# Patient Record
Sex: FEMALE | Race: WHITE
[De-identification: names, ages, dates, MRNs, and addresses within clinical notes are randomized per-mention and may not be internally consistent; named-entity substitution may affect disease eponyms.]

---

## 2017-12-22 ENCOUNTER — HOSPITAL ENCOUNTER (OUTPATIENT)
Dept: HOSPITAL 88 - RAD | Age: 82
End: 2017-12-22
Attending: INTERNAL MEDICINE
Payer: COMMERCIAL

## 2017-12-22 DIAGNOSIS — J20.9: Primary | ICD-10-CM

## 2017-12-22 PROCEDURE — 71020: CPT

## 2017-12-22 NOTE — DIAGNOSTIC IMAGING REPORT
PROCEDURE:

Frontal and lateral views of the chest.

 

COMPARISON: 4/11/16

 

INDICATIONS:   COUGH, FLU

     

FINDINGS:

Lines/tubes:  None.

 

Lungs:  The lungs are well inflated and clear. There is no evidence of 

pneumonia or pulmonary edema.

 

Pleura:  There is no pleural effusion or pneumothorax.

 

Heart and mediastinum:  The heart and the mediastinum are normal. Aorta 

is mildly calcified and tortuous.

 

Bones:  No acute bony abnormality.

 

IMPRESSION: 

 

No acute cardiopulmonary disease.

No change from prior exam.

 

Dictated by:  Mir Cheney M.D. on 12/22/2017 at 14:59     

Electronically approved by:  Mir Cheney M.D. on 12/22/2017 at 14:59

## 2018-05-01 ENCOUNTER — HOSPITAL ENCOUNTER (EMERGENCY)
Dept: HOSPITAL 88 - ER | Age: 83
Discharge: HOME | End: 2018-05-01
Payer: COMMERCIAL

## 2018-05-01 VITALS — WEIGHT: 166 LBS | HEIGHT: 63 IN | BODY MASS INDEX: 29.41 KG/M2

## 2018-05-01 VITALS — DIASTOLIC BLOOD PRESSURE: 95 MMHG | SYSTOLIC BLOOD PRESSURE: 139 MMHG

## 2018-05-01 DIAGNOSIS — S02.31XA: ICD-10-CM
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
## 2018-05-01 DIAGNOSIS — S51.811A: ICD-10-CM

## 2018-05-01 DIAGNOSIS — S42.411A: Primary | ICD-10-CM

## 2018-05-01 DIAGNOSIS — Z23: ICD-10-CM
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
## 2018-05-01 DIAGNOSIS — S01.111A: ICD-10-CM

## 2018-05-01 DIAGNOSIS — Z87.891: ICD-10-CM

## 2018-05-01 PROCEDURE — 90714 TD VACC NO PRESV 7 YRS+ IM: CPT

## 2018-05-01 PROCEDURE — 70486 CT MAXILLOFACIAL W/O DYE: CPT

## 2018-05-01 PROCEDURE — 99284 EMERGENCY DEPT VISIT MOD MDM: CPT

## 2018-05-01 PROCEDURE — 90471 IMMUNIZATION ADMIN: CPT

## 2018-05-01 PROCEDURE — 71045 X-RAY EXAM CHEST 1 VIEW: CPT

## 2018-05-01 PROCEDURE — 72125 CT NECK SPINE W/O DYE: CPT

## 2018-05-01 PROCEDURE — 70450 CT HEAD/BRAIN W/O DYE: CPT

## 2018-05-01 NOTE — XMS REPORT
Patient Summary Document

 Created on: 2018



LINDSAY MORIN

External Reference #: [id number]

: 1935

Sex: Female



Demographics







 Address  [address] [place] [address] [address]

[place]  [address]

 

 Home Phone  [phone]

 

 Preferred Language  Unknown

 

 Marital Status  Unknown

 

 Amish Affiliation  Unknown

 

 Race  Unknown

 

 Additional Race(s)  

 

 Ethnic Group  Unknown





Author







 Author  [facility]

 

 Address  Unknown

 

 Phone  Unavailable







Care Team Providers







 Care Team Member Name  Role  Phone

 

 JOSE MONTANO  Unavailable  Unavailable







Problems

This patient has no known problems.



Allergies, Adverse Reactions, Alerts

This patient has no known allergies or adverse reactions.



Medications

This patient has no known medications.



Results







 Test Description  Test Time  Test Comments  Text Results  Atomic Results  
Result Comments









 CHEST 2 VIEWS            [place] [address]      Patient Name: LINDSAY MORIN   
MR #: [id number]    : 1935 Age/Sex: 82/F  Acct #: [id number] Req #: 
[id number]  Adm Physician:     Ordered by: JOSE MONTANO MD  Report #: [id number]
[id number]   Location: RAD  Room/Bed:     ____________________________________________
_______________________________________________________    Procedure: [id number] 
DX/CHEST 2 VIEWS  Exam Date: 17                            Exam Time: 
1440       REPORT STATUS: Signed    PROCEDURE:   Frontal and lateral views of 
the chest.       COMPARISON: 16       INDICATIONS:   COUGH, FLU           
FINDINGS:   Lines/tubes:  None.       Lungs:  The lungs are well inflated and 
clear. There is no evidence of    pneumonia or pulmonary edema.       Pleura:  
There is no pleural effusion or pneumothorax.       Heart and mediastinum:  The 
heart and the mediastinum are normal. Aorta    is mildly calcified and 
tortuous.       Bones:  No acute bony abnormality.       IMPRESSION:        No 
acute cardiopulmonary disease.   No change from prior exam.       Dictated by:  
Mir Colvin M.D. on 2017 at 14:59        Electronically approved by:  
Mir Colvin M.D. on 2017 at 14:59                   Dictated By: MIR COLVIN MD  Electronically Signed By: MIR COLVIN MD on 17 9016  
Transcribed By: GÓMEZ on 17 0240       COPY TO:   JOSE MONTANO MD

## 2018-05-01 NOTE — DIAGNOSTIC IMAGING REPORT
EXAMINATION:  CHEST SINGLE (PORTABLE)    



INDICATION:      

\S\fall

\S\[id number]

\S\1910



COMPARISON:  None

     

FINDINGS:  AP view   



TUBES and LINES:  None.



LUNGS:  Lungs are well inflated.  Lungs are clear.   There is no evidence of

pneumonia or pulmonary edema.



PLEURA:  No pleural effusion or pneumothorax.



HEART AND MEDIASTINUM:  The cardiomediastinal silhouette is unremarkable. Aorta

is mildly calcified and tortuous.



BONES AND SOFT TISSUES:  No acute osseous lesion.  Soft tissues are

unremarkable.



UPPER ABDOMEN: No free air under the diaphragm.    



IMPRESSION: 

No acute thoracic abnormality.





Signed by: Dr. Mir Cheney MD on 5/1/2018 7:34 PM

## 2018-05-01 NOTE — DIAGNOSTIC IMAGING REPORT
PROCEDURE:X-RAY RIGHT ELBOW, COMPLETE

 

COMPARISON:None.

 

INDICATIONS:FALL, RIGHT ELBOW PAIN

 

FINDINGS:

Generalized osteopenia, which limits evaluation of the bony structures.

Linear lucency traversing the medial epicondyle of the distal humerus, 

with cortical step-off

. A linear lucency is also noted in the lateral view extending from the 

anterior to the posterior aspect of the distal humerus, with cortical 

step-off, consistent with an acute, minimally displaced fracture.

Marked elevation of the anterior fat-pad.

Other bony structures are intact.

Mild soft tissue swelling in the distal humerus.

No lytic or blastic lesions.

 

CONCLUSION:

Acute, oblique minimally displaced fracture of the distal humerus, at 

the level of the epicondyles 

Associated likely effusion/hemarthrosis 

 

Dion Gupta M.D.  

Dictated by:  Dion Gupta M.D. on 5/01/2018 at 18:20     

Electronically approved by:  Dion Gupta M.D. on 5/01/2018 at 

18:20

## 2018-05-01 NOTE — DIAGNOSTIC IMAGING REPORT
History: Fall, pain

Comparison studies:None



Technique:

Axial images were obtained from the brain, face and cervical spine.

Coronal and sagittal reconstructions obtained from the axial data.

Intravenous contrast: None



Findings:



Head CT:



Scalp/skull: 

No abnormalities. No fractures, blastic or lytic lesions.



Extra-axial spaces: No masses.  No fluid collections.



Brain sulci: Appropriate for age.

Ventricles: Normal in size and configuration. No hydrocephalus.



Parenchyma: 

Scattered and confluent hypodensities of the periventricular and deep white

matter. Small chronic lacunar infarcts at the right striatocapsular region,

left caudate head and left thalamus. 

No masses, hemorrhage, acute or chronic cortical vascular insults.



Sellar/suprasellar region: No abnormalities

Craniocervical junction: Patent foramen magnum.  No Chiari one malformation.



Atherosclerotic calcifications of the carotid siphons and right intradural

vertebral artery.



Maxillofacial CT:



Soft tissues:

 Right premaxillary and preseptal soft tissue swelling.



Bones: 

Right orbital floor fracture as described below, no other fractures are seen.



Orbits:

 Acute mildly inferior displaced fracture of the right orbital floor with

involvement of the infraorbital canal and herniated extraconal fat. Anterior

orbital rim appears intact Normal appearance of the extraocular muscles.



Bilateral cataract surgery changes.



Paranasal sinuses:

Air-fluid level at the right maxillary sinus, likely. The remaining paranasal

sinuses are clear.



Cervical spine CT:



Fractures: None.

Soft tissues: No gross abnormalities.



Atlantoaxial articulation: Degenerative changes without acute abnormality.

Alignment: Normal lordosis. No scoliosis.

Cervicomedullary junction: No abnormalities. The foramen magnum is patent.



Vertebrae: 

No infection or neoplasm.

 

Degenerative changes: 

At C3-4, left facet hypertrophy results in moderate left foraminal narrowing.

At C4-5, left uncinate process and facet hypertrophy results in moderate left

foraminal narrowing.

At C5-6 bilateral uncinate process hypertrophy and left facet hypertrophy

results in mild canal stenosis, mild right and severe left foraminal narrowing.

Disc degeneration with decreased intervertebral space and sclerotic changes

from C5 through C7.



Incidental findings:

Not.



Impression:



Head CT:

1.  No acute intracranial abnormality..

2.  Moderate chronic microvascular ischemic changes of the white matter.



Facial CT:

1.  Small right orbital  floor blowout fracture with mild herniated extraconal

fat and hemorrhagic opacification of the maxillary sinus. Normal appearance of

the extraocular muscle musculature.

2.  Right premaxillary and preseptal soft tissue swelling.



Cervical spine CT:

1.  No acute cervical abnormalities.

2.  Cannot exclude ligament, spinal cord and or vascular abnormalities on the

basis of this examination.



Signed by: DR Paulo Thompson M.D. on 5/1/2018 5:59 PM

## 2018-08-03 LAB
BASOPHILS # BLD AUTO: 0 10*3/UL (ref 0–0.1)
BASOPHILS NFR BLD AUTO: 0.7 % (ref 0–1)
DEPRECATED NEUTROPHILS # BLD AUTO: 3.7 10*3/UL (ref 2.1–6.9)
EOSINOPHIL # BLD AUTO: 0.2 10*3/UL (ref 0–0.4)
EOSINOPHIL NFR BLD AUTO: 4.1 % (ref 0–6)
ERYTHROCYTE [DISTWIDTH] IN CORD BLOOD: 12.6 % (ref 11.7–14.4)
HCT VFR BLD AUTO: 33.1 % (ref 34.2–44.1)
HGB BLD-MCNC: 11.2 G/DL (ref 12–16)
LYMPHOCYTES # BLD: 1.2 10*3/UL (ref 1–3.2)
LYMPHOCYTES NFR BLD AUTO: 21.1 % (ref 18–39.1)
MCH RBC QN AUTO: 30.9 PG (ref 28–32)
MCHC RBC AUTO-ENTMCNC: 33.8 G/DL (ref 31–35)
MCV RBC AUTO: 91.4 FL (ref 81–99)
MONOCYTES # BLD AUTO: 0.6 10*3/UL (ref 0.2–0.8)
MONOCYTES NFR BLD AUTO: 10.4 % (ref 4.4–11.3)
NEUTS SEG NFR BLD AUTO: 63.4 % (ref 38.7–80)
PLATELET # BLD AUTO: 206 X10E3/UL (ref 140–360)
RBC # BLD AUTO: 3.62 X10E6/UL (ref 3.6–5.1)

## 2018-08-07 ENCOUNTER — HOSPITAL ENCOUNTER (OUTPATIENT)
Dept: HOSPITAL 88 - OR | Age: 83
Discharge: HOME | End: 2018-08-07
Attending: UROLOGY
Payer: COMMERCIAL

## 2018-08-07 DIAGNOSIS — I10: ICD-10-CM

## 2018-08-07 DIAGNOSIS — Z88.8: ICD-10-CM

## 2018-08-07 DIAGNOSIS — Z87.891: ICD-10-CM

## 2018-08-07 DIAGNOSIS — M19.019: ICD-10-CM

## 2018-08-07 DIAGNOSIS — Z01.810: ICD-10-CM

## 2018-08-07 DIAGNOSIS — Z90.710: ICD-10-CM

## 2018-08-07 DIAGNOSIS — Z96.651: ICD-10-CM

## 2018-08-07 DIAGNOSIS — Z88.6: ICD-10-CM

## 2018-08-07 DIAGNOSIS — E78.5: ICD-10-CM

## 2018-08-07 DIAGNOSIS — N20.0: Primary | ICD-10-CM

## 2018-08-07 DIAGNOSIS — Z91.048: ICD-10-CM

## 2018-08-07 DIAGNOSIS — Z01.812: ICD-10-CM

## 2018-08-07 DIAGNOSIS — Z96.642: ICD-10-CM

## 2018-08-07 DIAGNOSIS — Z85.42: ICD-10-CM

## 2018-08-07 PROCEDURE — 50590 FRAGMENTING OF KIDNEY STONE: CPT

## 2018-08-07 PROCEDURE — 36415 COLL VENOUS BLD VENIPUNCTURE: CPT

## 2018-08-07 PROCEDURE — 74018 RADEX ABDOMEN 1 VIEW: CPT

## 2018-08-07 PROCEDURE — 85025 COMPLETE CBC W/AUTO DIFF WBC: CPT

## 2018-08-07 PROCEDURE — 93005 ELECTROCARDIOGRAM TRACING: CPT

## 2018-08-07 NOTE — DIAGNOSTIC IMAGING REPORT
PROCEDURE:X-RAY ABDOMEN - KUB

 

COMPARISON:10/28/2016.

 

INDICATIONS:CALCULUS OF KIDNEY

 

FINDINGS:

No suspicious calcifications project over the renal shadows. Multiple 

left pelvic calcifications are again noted which likely reflect 

phleboliths. Bilateral pelvic sidewall surgical clips along with right 

upper quadrant surgical clips. Bowel gas pattern is nonobstructive. 

Round-crescentic calcifications project over the left upper quadrant 

which may reflect a splenic artery aneurysm.

 

Regional skeletal structures are intact with partial visualization of 

left hip arthroplasty. Associated degenerative changes of the left 

sacroiliac joint. Mild lumbar spine degenerative disc changes.

 

 

CONCLUSION:

No plain film evidence of urolithiasis.

 

Nonobstructive bowel gas pattern.

 

Left upper quadrant crescentic calcifications measuring approximately 

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 

peripherally calcified splenic artery aneurysm. CT angiography of the 

abdomen and pelvis may be obtained for further evaluation.

 

 

Dictated by:  Camilo Daniels M.D. on 8/07/2018 at 7:17     

Electronically approved by:  Camilo Daniels M.D. on 8/07/2018 at 7:17

## 2018-08-31 ENCOUNTER — HOSPITAL ENCOUNTER (OUTPATIENT)
Dept: HOSPITAL 88 - OR | Age: 83
Discharge: HOME | End: 2018-08-31
Attending: UROLOGY
Payer: COMMERCIAL

## 2018-08-31 VITALS — DIASTOLIC BLOOD PRESSURE: 65 MMHG | SYSTOLIC BLOOD PRESSURE: 132 MMHG

## 2018-08-31 DIAGNOSIS — Z85.42: ICD-10-CM

## 2018-08-31 DIAGNOSIS — I45.10: ICD-10-CM

## 2018-08-31 DIAGNOSIS — I10: ICD-10-CM

## 2018-08-31 DIAGNOSIS — Z91.048: ICD-10-CM

## 2018-08-31 DIAGNOSIS — Z88.8: ICD-10-CM

## 2018-08-31 DIAGNOSIS — N20.0: Primary | ICD-10-CM

## 2018-08-31 DIAGNOSIS — I49.9: ICD-10-CM

## 2018-08-31 DIAGNOSIS — Z88.6: ICD-10-CM

## 2018-08-31 PROCEDURE — 74018 RADEX ABDOMEN 1 VIEW: CPT

## 2018-08-31 PROCEDURE — 50590 FRAGMENTING OF KIDNEY STONE: CPT

## 2018-08-31 NOTE — DIAGNOSTIC IMAGING REPORT
ABDOMEN-1VIEW (KUB)



Clinical history: Preoperative

Technique: AP view abdomen

Comparison: 8/07/2018.



Findings:

Overlying bowel gas obscures visualization of stones. There are three 2-3 mm

calcifications overlying the left lower pole kidney. Additional 3 mm

calcification projects over the right mid kidney and 5 mm calcification

projects over the right lower pole kidney. No calcifications projecting over

the expected location of the ureters or bladders. Pelvic phleboliths are noted.



Nonobstructive bowel gas pattern. Status post cholecystectomy. Surgical clips

project over the pelvis bilaterally. Partially seen left hip arthroplasty. No

acute bony findings. Degenerative changes of the lower lumbar spine. 



Impression:

Bowel gas partially limits radiographic evaluation for stones. Bilateral

calcifications projecting over the kidneys, measuring up to 3 mm on the left

and 5 mm on the right, which may represent stones. 



Signed by: Dr. Riddhi Salazar MD on 8/31/2018 8:08 AM

## 2018-10-03 NOTE — XMS REPORT
Continuity of Care Document

 Created on: 2018



SKY CAMPBELL

External Reference #: [id number]

: 1935

Sex: Female



Demographics







 Address  [address]  [address]

 

 Home Phone  [phone]

 

 Preferred Language  Unknown

 

 Marital Status  Unknown

 

 Restorationism Affiliation  Unknown

 

 Race  White

 

 Ethnic Group  Unknown





Author







 Author  [facility]

 

 Organization  [facility]

 

 Address  [address] [place]  [address]



 

 Phone  Unavailable







Support







 Name  Relationship  Address  Phone

 

 JULIAN GARLAND MD  Caregiver  [address]

[place]  [address]  Unavailable

 

 JOSE MONTANO MD  Caregiver  [address] [place]  [address] [phone]

 

 JOSE MONTANO MD  Caregiver  [address] [place]  [address] [phone]

 

 ROSSYVIRY MCCOY  Next Of Kin  [address] [place]  [address] [phone]







Care Team Providers







 Care Team Member Name  Role  Phone

 

 JOSE MONTANO MD  PCP  [phone]







Insurance Providers







 Guarantor  Sky Campbell

 

 Address  [address] [address]

 

 Phone  [phone]

 

 Email  PTDECLINED











 Payer  [employer]

 

 Policy Number  [id number]

 

 Subscriber's Name  Eder Campbellan ARIAS

 

 Relationship  18 Self / Same As Patient

 

 Group Number  [id number]

 

 Group Name  [facility] [employer]

 

 Effective Date  17







Advance Directives







 Directive  Response  Recorded Date/Time

 

 Does the patient have an advance directive?  Yes  10/10/12 4:19pm

 

 If yes, is advance directive on file with [facility]?  Yes  18 5:00pm

 

 If not on file with [facility] will patient provide a copy?  No  18 5:00pm

 

 Do you have a Directive to Physician?  No  18 5:00pm

 

 Do you have a Medical Power of ?  No  18 5:00pm

 

 Do you have an out of hospital Do Not Resuscitate Order?  No  18 5:00pm

 

 Do you have any special needs we should be aware of?  No  18 5:00pm

 

 Do you have a support person here with you today?  Yes  18 5:00pm

 

 Did patient receive Notice of Privacy Practices?  Yes  18 5:00pm

 

 Did patient receive patient rights and responsibilities?  Yes  18 5:00pm







Problems







 Medical Problem  Onset Date  Status

 

 Contusion of right knee  Unknown  Acute







Medications





Current Home Medications





 Medication  Dose  Units  Route  Directions  Days  Qty  Instructions  Start Date

 

 Acetaminophen (Tylenol Extra Strength) 500 Mg Tablet  1,000  Mg  Oral  Daily  
          

 

 Acetaminophen With Codeine (Tylenol With Codeine #3 Tablet) 1 Each Tablet  300
  Mg  Oral  Every 6 Hours as needed for Pain            

 

 Citracil  1  Tab  Oral  Daily            

 

 Metoprolol Succinate 25 Mg Tab.er.24h  25  Mg  Oral  Daily            

 

 Multivitamin (One Daily) 1 Each Tablet  1  Tab  Oral  Daily            









Past Home Medications





 Medication  Directions  Ordered  Status

 

 Allopurinol , 100 Mg  Daily     Discontinued

 

 Amoxicillin Trihydrate (Amoxicillin) 500 Mg Capsule, 500 Mg Oral  Twice A Day 
    Discontinued

 

 Calcium Cmb 2/D3/Min Cmb34/Gen (Citracal + Bone Density Tablet) 1 Each Tablet,
        Discontinued

 

 Docusate Sodium (Colace) 100 Mg Capsule, 100 Mg Oral  Every 6-8 Hours as 
needed     Discontinued

 

 Ferrous Sulfate ,        Discontinued

 

 Hydrocodone Bit/Acetaminophen (Norco 7.5-325 Tablet) 1 Each Tablet,        
Discontinued

 

 Hydrocodone Bit/Acetaminophen (Vicodin Hp  Mg Tablet) 1 Each Tablet, 1 
Tab Oral  Every 4-6 Hours as needed     Discontinued

 

 Melatonin ,        Discontinued

 

 Multivitamin ,   Oral  Daily     Discontinued

 

 Oxybutynin Chloride 5 Mg Tablet, 5 Mg Oral  Daily     Discontinued

 

 Softner , 100 Mg        Discontinued







Social History







 Social History Problem  Response  Recorded Date/Time  Onset Date  Status

 

 Hx Psychiatric Problems  No  10/10/2012 4:19pm  Not Applicable  Not Applicable











 Smoking Status  Start Date  Stop Date

 

 Never Smoker      







Hospital Discharge Instructions

No hospital discharge instruction information available.



Plan of Care







 Discharge Date  18 9:55pm

 

 Disposition  HOME, SELF-CARE

 

 Condition at Discharge  Stable

 

 Instructions/Education Provided  Splint/Cast

Splint/Cast Care

Fractures - Humerus

Fractures - Orbital

RICE Therapy

 

 Prescriptions  See Medication Section

 

 Referrals  YOUNG AVILEZ MD

Order Date: Call for an appointment

Address:

 [address] [place] [address]

[address]

[place], [place] [address] [phone]

 

 Additional Instructions/Education  Keep your injured extremity elevated above 
your heart, use ice 

packs for 15 to 20 minutes every 1-2 hours. This will help 

decrease pain and swelling. Take Regular Tylenol every 4-6 hours 

as needed for pain. If prescribed pain medication on discharge, 

take the pain medication as prescribed and adhere to the warnings 

given for pain medication such as no swimming, driving operating 

heavy machinery, drinking or mixing with other medications that 

can interact with the narcotic. Follow up with the Orthopedic 

referral physician listed, call next business day to schedule 

your follow-up appointment. Return to the Emergency Department 

for any shortness of breath, increased pain injured extremity, 

discoloration of extremity, decreased circulation of extremity, 

or any new concerns.







You must follow up with your Ophthalmologist due to the orbital 

fracture.  I gave you an Orthopedic Physician for the elbow 

fracture to follow up with upon making your appointment. 







Functional Status

No functional status information available.



Allergies, Adverse Reactions, Alerts







 Allergen  Type  Severity  Reaction  Status  Last Updated

 

 NSAIDS (Non-Steroidal Anti-Inflamma  Allergy  Unknown     Active  10/28/16

 

 Morphine  Allergy  Unknown     Active  10/28/16

 

 Aspirin  Allergy  Mild  HIVES  Active  06/14/10

 

 ANTIHISTAMINES  Allergy  Unknown     Active  10/28/16

 

 ANTIHISTIMINES  Allergy  Unknown     Active  18

 

 PLASTIC TAPE  Allergy  Mild  RASH  Active  06/14/10







Immunizations

No immunization information available.



Vital Signs





Acute Vital Signs





 Vital  Response  Date/Time

 

 Temperature (Fahrenheit)  98.2 degrees F (97.6 - 99.5)  2018 9:32pm

 

 Pulse      

 

    Pulse Rate (adult)  74 bpm (60 - 90)  2018 9:32pm

 

 Respiratory Rate  19 bpm (12 - 24)  2018 9:32pm

 

 Blood Pressure  139/95 mm Hg  2018 9:32pm

 

 Height  5 ft 3 in  2018 4:34pm

 

 Weight  166 lb  2018 4:34pm

 

 Body Mass Index  29.4 kg/m^2  2018 4:34pm







Results

No relevant diagnostic test, laboratory data and/or discharge summary 
information available.



Procedures







 Procedure  Status  Date  Provider(s)

 

 X-ray of chest, two views  Active  17  JOSE MONTANO MD

 

 Computed tomography of brain without radiopaque contrast  Active  18  
JULIAN GARLAND MD

 

 CT maxillofacial area wo contrast  Active  18  JULIAN GARLAND MD

 

 Computed tomography of cervical spine without contrast  Active  18  
JULIAN GARLAND MD







Encounters







 Encounter  Location  Arrival/Admit Date  Discharge/Depart Date  Attending 
Provider

 

 Departed Emergency Room  [facility]  18 3:53pm   9:55pm  JULIAN GARLAND MD

 

 Registered Clinic  [facility]  17 2:20pm     JOSE MONTANO MD

## 2018-10-08 LAB
BASOPHILS # BLD AUTO: 0 10*3/UL (ref 0–0.1)
BASOPHILS NFR BLD AUTO: 0.6 % (ref 0–1)
DEPRECATED NEUTROPHILS # BLD AUTO: 4 10*3/UL (ref 2.1–6.9)
EOSINOPHIL # BLD AUTO: 0.2 10*3/UL (ref 0–0.4)
EOSINOPHIL NFR BLD AUTO: 2.9 % (ref 0–6)
ERYTHROCYTE [DISTWIDTH] IN CORD BLOOD: 12.5 % (ref 11.7–14.4)
HCT VFR BLD AUTO: 36.1 % (ref 34.2–44.1)
HGB BLD-MCNC: 12.1 G/DL (ref 12–16)
LYMPHOCYTES # BLD: 1.9 10*3/UL (ref 1–3.2)
LYMPHOCYTES NFR BLD AUTO: 28.5 % (ref 18–39.1)
MCH RBC QN AUTO: 30.4 PG (ref 28–32)
MCHC RBC AUTO-ENTMCNC: 33.5 G/DL (ref 31–35)
MCV RBC AUTO: 90.7 FL (ref 81–99)
MONOCYTES # BLD AUTO: 0.6 10*3/UL (ref 0.2–0.8)
MONOCYTES NFR BLD AUTO: 9.1 % (ref 4.4–11.3)
NEUTS SEG NFR BLD AUTO: 58.6 % (ref 38.7–80)
PLATELET # BLD AUTO: 230 X10E3/UL (ref 140–360)
RBC # BLD AUTO: 3.98 X10E6/UL (ref 3.6–5.1)

## 2018-10-12 ENCOUNTER — HOSPITAL ENCOUNTER (OUTPATIENT)
Dept: HOSPITAL 88 - OR | Age: 83
Discharge: HOME | End: 2018-10-12
Attending: UROLOGY
Payer: COMMERCIAL

## 2018-10-12 VITALS — DIASTOLIC BLOOD PRESSURE: 85 MMHG | SYSTOLIC BLOOD PRESSURE: 159 MMHG

## 2018-10-12 DIAGNOSIS — N20.0: Primary | ICD-10-CM

## 2018-10-12 DIAGNOSIS — Z01.812: ICD-10-CM

## 2018-10-12 DIAGNOSIS — Z88.6: ICD-10-CM

## 2018-10-12 DIAGNOSIS — Z87.891: ICD-10-CM

## 2018-10-12 DIAGNOSIS — N95.2: ICD-10-CM

## 2018-10-12 DIAGNOSIS — Z88.8: ICD-10-CM

## 2018-10-12 DIAGNOSIS — N35.92: ICD-10-CM

## 2018-10-12 DIAGNOSIS — I10: ICD-10-CM

## 2018-10-12 DIAGNOSIS — N32.89: ICD-10-CM

## 2018-10-12 PROCEDURE — 74018 RADEX ABDOMEN 1 VIEW: CPT

## 2018-10-12 PROCEDURE — 52281 CYSTOSCOPY AND TREATMENT: CPT

## 2018-10-12 PROCEDURE — 85025 COMPLETE CBC W/AUTO DIFF WBC: CPT

## 2018-10-12 PROCEDURE — 36415 COLL VENOUS BLD VENIPUNCTURE: CPT

## 2018-10-12 PROCEDURE — 50590 FRAGMENTING OF KIDNEY STONE: CPT

## 2018-10-12 NOTE — DIAGNOSTIC IMAGING REPORT
ABDOMEN-1VIEW (KUB)



Clinical history: Pre-Op

Technique: AP view abdomen

Comparison: KUB 8/31/2018.



Findings:

Bowel gas partially obscures visualization of the kidneys. There are multiple

bilateral renal calcifications, measuring up to 5 mm in the left upper pole, 4

mm in the left lower pole, 4 mm in the right lower pole, and 1-3 mm scattered

throughout the right kidney. Some of the calcifications were obscured on KUB

from 8/31/2018. No calcifications projecting over the expected location of the

ureters or bladders. Pelvic phleboliths are noted.



Nonobstructive bowel gas pattern. Abundant stool throughout the colon. Status

post cholecystectomy. Surgical clips project over the pelvis bilaterally.

Partially seen left hip arthroplasty. No acute bony findings. Degenerative

changes of the lower lumbar spine. 



Impression:

Bilateral calcifications projecting over the kidneys, measuring up to 5 mm on

the left and 4 mm on the right, which may represent stones. 



Abundant stool throughout the colon. 



Signed by: Dr. Riddhi Salazar MD on 10/12/2018 8:08 AM

## 2018-10-25 NOTE — OPERATIVE REPORT
DATE OF PROCEDURE:  August 31, 2018 

 

PREOPERATIVE DIAGNOSIS:  Left nephrolithiasis.



POSTOPERATIVE DIAGNOSIS:  Left nephrolithiasis.



OPERATIONS PERFORMED

1. Staged left-sided extracorporeal shock wave lithotripsy.  

2. Supervision of fluoroscopy.  No radiologist present.  



ANESTHESIA:  General.



COMPLICATIONS:  None. 



CLINICAL SUMMARY:  Sky Campbell is an 83-year-old woman with bilateral 

nephrolithiasis, who is brought for staged procedure.  She is aware of the 

risks of bleeding, infection, injury to adjacent structures, need for 

additional procedures, and elected to proceed.



OPERATIVE PROCEDURE IN DETAIL:  Informed consent was verified.  Sky Campbell was properly identified, taken to operating room, placed on the 

lithotripsy table in supine position.  Anesthesia was uneventfully begun.  

The patient had a prior left ESWL, but has residual, but smaller stones now 

present in the upper pole and lower pole.  We localized both stone regions 

with biplanar fluoroscopy and split the total of 3000 shocks between the 2 

stone regions.  Additional fragmentation was noted.  The patient was then 

uneventfully reversed from anesthesia and taken to recovery room in stable 

condition.  There were no complications to the procedure.  She tolerated 

the procedure well.  Explicit postoperative instructions were given.  Will 

plan on returning the patient to the operating room.  

At which point in time, she will hopefully undergo a right ESWL, as well as 

cystoscopy and retrograde ureteropyelography.







  









DD:  10/25/2018 00:20

DT:  10/25/2018 00:51

Job#:  [id number]



cc:JOSE MONTANO MD

## 2018-11-21 NOTE — OPERATIVE REPORT
DATE OF PROCEDURE:  October 12, 2018 

 

PREOPERATIVE DIAGNOSES:

1. Right nephrolithiasis.

2. Microscopic hematuria.



POSTOPERATIVE DIAGNOSES:

1. Right nephrolithiasis.

2. Microscopic hematuria.

3. Urethral stenosis.

4. Atrophic (senile) vaginitis.



OPERATIONS PERFORMED:  Note these were all staged procedures as part of a 

multistage, multistep process of managing the patient's urolithiasis.

1. Right-sided extracorporeal shock wave lithotripsy (separate procedure 

performed for the right nephrolithiasis).

2. Cystourethroscopy with bilateral ureteral catheterization and retrograde 

ureteropyelography (separate procedure performed for the microhematuria).

3. Interpretation of retrograde ureteropyelography.

4. Supervision of fluoroscopy, no radiologist present.

5. Pelvic examination under anesthesia.

6. Cystourethroscopy with calibration and dilation of urethral stenosis 

(separate procedure performed for the urethral stenosis).



ANESTHESIA:  General.



COMPLICATIONS:  None.



CLINICAL SUMMARY:  Sky Campbell is an 83-year-old woman who underwent 

ESWL procedure twice to the left kidney recently.  The patient is brought 

to the operating room for management of her right-sided stone.  She is 

aware of the risks of bleeding, infection, injury to adjacent structures, 

need for additional procedures, and elected to proceed.



OPERATIVE PROCEDURE IN DETAIL:  Informed consent was verified.  Sky Campbell 

was properly identified, taken to the operating room, and placed on the 

lithotripsy table in supine position.  Anesthesia was uneventfully begun.  

The patient's right nephrolithiasis specifically in the upper pole la 

and the mid la was localized with biplanar fluoroscopy.  Total of 3000 

shocks were distributed among them with some degree of fragmentation noted.



The patient was carefully and gently repositioned in dorsal lithotomy 

position with all pressure points well padded.  Her genitalia were prepared 

and draped in usual sterile fashion.  The 22.5-Tristanian cystoscope sheath was 

attempted to be placed into the patient's urethra, however, the urethra was 

stenosed.  The urethra was calibrated to approximately 16-Tristanian in size 

and was dilated progressively to 28-Tristanian in size.  This last sound was 

held in place for several minutes to allow dilation.  The cystoscope sheath 

was then reintroduced uneventfully.  Panendoscopy of the urinary bladder 

revealed no suspicious mucosal lesions, no tumors, no stones, and no 

diverticula.  Normally positioned and configured ureteral orifices were 

identified.  A ureteral catheter was used to cannulate each ureter, and 

retrograde ureteral pyelograms were performed.



Interpretation of retrograde ureteropyelography:  Contrast was instilled in 

retrograde fashion bilaterally.  The left side was unremarkable.  We really 

could not visualize the small stones once contrast was injected.  On KUB, 

the patient had small residual fragments in the upper pole la and lower 

pole la on left hand side.  On the right hand side, there were filling 

defects located in the region of where the stones were fragmented.  These 

filling defects correspond to stone debris as well as blood clots.  

Nevertheless, there was no hydronephrosis, and unobstructed drainage was 

observed fluoroscopically.



The patient was again re-examined.  It revealed some trabeculations, but no 

suspicious lesions.  The patient's bladder was drained.  The cystoscope was 

withdrawn.  Pelvic examination under anesthesia revealed severely atrophic 

vaginitis with an extremely foreshortened vagina due to prior surgery.  No 

abnormal palpable pelvic masses were appreciated.  There were no obvious 

mucosal lesions.  The patient was then uneventfully reversed from 

anesthesia and taken to recovery room in stable condition.  There were no 

complications to the procedure.  She tolerated the procedure well.  

Explicit postop instructions were given.  Will follow the patient up in the 

office.







DD:  11/20/2018 23:48

DT:  11/21/2018 00:48

Job#:  [id number] 





cc:JOSE MONTANO MD

## 2019-03-05 LAB
ANION GAP SERPL CALC-SCNC: 11.5 MMOL/L (ref 8–16)
BASOPHILS # BLD AUTO: 0 10*3/UL (ref 0–0.1)
BASOPHILS NFR BLD AUTO: 0.8 % (ref 0–1)
BUN SERPL-MCNC: 30 MG/DL (ref 7–26)
BUN/CREAT SERPL: 29 (ref 6–25)
CALCIUM SERPL-MCNC: 9.4 MG/DL (ref 8.4–10.2)
CHLORIDE SERPL-SCNC: 106 MMOL/L (ref 98–107)
CO2 SERPL-SCNC: 26 MMOL/L (ref 22–29)
DEPRECATED APTT PLAS QN: 29.8 SECONDS (ref 23.8–35.5)
DEPRECATED INR PLAS: 0.8
DEPRECATED NEUTROPHILS # BLD AUTO: 3.2 10*3/UL (ref 2.1–6.9)
EGFRCR SERPLBLD CKD-EPI 2021: 50 ML/MIN (ref 60–?)
EOSINOPHIL # BLD AUTO: 0.1 10*3/UL (ref 0–0.4)
EOSINOPHIL NFR BLD AUTO: 1.9 % (ref 0–6)
ERYTHROCYTE [DISTWIDTH] IN CORD BLOOD: 12.5 % (ref 11.7–14.4)
GLUCOSE SERPLBLD-MCNC: 137 MG/DL (ref 74–118)
HCT VFR BLD AUTO: 33 % (ref 34.2–44.1)
HGB BLD-MCNC: 11 G/DL (ref 12–16)
LYMPHOCYTES # BLD: 1 10*3/UL (ref 1–3.2)
LYMPHOCYTES NFR BLD AUTO: 20.4 % (ref 18–39.1)
MCH RBC QN AUTO: 29.9 PG (ref 28–32)
MCHC RBC AUTO-ENTMCNC: 33.3 G/DL (ref 31–35)
MCV RBC AUTO: 89.7 FL (ref 81–99)
MONOCYTES # BLD AUTO: 0.4 10*3/UL (ref 0.2–0.8)
MONOCYTES NFR BLD AUTO: 9.2 % (ref 4.4–11.3)
NEUTS SEG NFR BLD AUTO: 67.5 % (ref 38.7–80)
PLATELET # BLD AUTO: 233 X10E3/UL (ref 140–360)
POTASSIUM SERPL-SCNC: 3.5 MMOL/L (ref 3.5–5.1)
PROTHROMBIN TIME: 11.5 SECONDS (ref 11.9–14.5)
RBC # BLD AUTO: 3.68 X10E6/UL (ref 3.6–5.1)
SODIUM SERPL-SCNC: 140 MMOL/L (ref 136–145)

## 2019-03-05 NOTE — DIAGNOSTIC IMAGING REPORT
EXAMINATION:  CHEST 2 VIEWS    



INDICATION:      Right L4-L5 neural foraminal narrowing. Preop chest x-ray for

lower back surgery.



COMPARISON:  Chest x-ray 5/1/2018.

     

FINDINGS:  PA and lateral views



TUBES and LINES:  None.



LUNGS:  Lungs are well inflated. Calcified granuloma in the left lung base.

Lungs are clear.  There is no evidence of pneumonia or pulmonary edema.



PLEURA:  No pleural effusion or pneumothorax.



HEART AND MEDIASTINUM:  The cardiomediastinal silhouette is unremarkable. There

are atherosclerotic calcifications within the aorta. Calcified bilateral hilar

lymph nodes.



BONES AND SOFT TISSUES:  No acute osseous lesion.  Soft tissues are

unremarkable.



UPPER ABDOMEN: No free air under the diaphragm. There are cholecystectomy

clips.



IMPRESSION: 

No acute thoracic abnormality.





Signed by: Dr. Talib Mi M.D. on 3/5/2019 4:35 PM

## 2019-03-07 ENCOUNTER — HOSPITAL ENCOUNTER (OUTPATIENT)
Dept: HOSPITAL 88 - OR | Age: 84
Setting detail: OBSERVATION
LOS: 1 days | Discharge: HOME | End: 2019-03-08
Attending: NEUROLOGICAL SURGERY | Admitting: NEUROLOGICAL SURGERY
Payer: MEDICARE

## 2019-03-07 VITALS — HEIGHT: 63 IN | WEIGHT: 150 LBS | BODY MASS INDEX: 26.58 KG/M2

## 2019-03-07 VITALS — SYSTOLIC BLOOD PRESSURE: 135 MMHG | DIASTOLIC BLOOD PRESSURE: 65 MMHG

## 2019-03-07 VITALS — DIASTOLIC BLOOD PRESSURE: 60 MMHG | SYSTOLIC BLOOD PRESSURE: 131 MMHG

## 2019-03-07 VITALS — DIASTOLIC BLOOD PRESSURE: 65 MMHG | SYSTOLIC BLOOD PRESSURE: 135 MMHG

## 2019-03-07 DIAGNOSIS — M19.90: ICD-10-CM

## 2019-03-07 DIAGNOSIS — I12.9: ICD-10-CM

## 2019-03-07 DIAGNOSIS — M51.16: Primary | ICD-10-CM

## 2019-03-07 DIAGNOSIS — N18.9: ICD-10-CM

## 2019-03-07 PROCEDURE — 36415 COLL VENOUS BLD VENIPUNCTURE: CPT

## 2019-03-07 PROCEDURE — 85730 THROMBOPLASTIN TIME PARTIAL: CPT

## 2019-03-07 PROCEDURE — 71046 X-RAY EXAM CHEST 2 VIEWS: CPT

## 2019-03-07 PROCEDURE — 63056 DECOMPRESS SPINAL CORD LMBR: CPT

## 2019-03-07 PROCEDURE — 88311 DECALCIFY TISSUE: CPT

## 2019-03-07 PROCEDURE — 80048 BASIC METABOLIC PNL TOTAL CA: CPT

## 2019-03-07 PROCEDURE — 93005 ELECTROCARDIOGRAM TRACING: CPT

## 2019-03-07 PROCEDURE — 86900 BLOOD TYPING SEROLOGIC ABO: CPT

## 2019-03-07 PROCEDURE — 88304 TISSUE EXAM BY PATHOLOGIST: CPT

## 2019-03-07 PROCEDURE — 86850 RBC ANTIBODY SCREEN: CPT

## 2019-03-07 PROCEDURE — 85610 PROTHROMBIN TIME: CPT

## 2019-03-07 PROCEDURE — 85025 COMPLETE CBC W/AUTO DIFF WBC: CPT

## 2019-03-07 PROCEDURE — 72020 X-RAY EXAM OF SPINE 1 VIEW: CPT

## 2019-03-07 RX ADMIN — CEFAZOLIN SODIUM SCH MLS/HR: 1 SOLUTION INTRAVENOUS at 16:26

## 2019-03-07 RX ADMIN — SODIUM CHLORIDE, POTASSIUM CHLORIDE, SODIUM LACTATE AND CALCIUM CHLORIDE SCH MLS/HR: 600; 310; 30; 20 INJECTION, SOLUTION INTRAVENOUS at 16:26

## 2019-03-07 NOTE — NUR
RECEIVED PATIENT FROM PACU, PATIENT IS ALERT AND ORIENTED, DRY DRESSING INTACT ON LOWER 
BACK, PATIENT SELF AMBULATORY WITHOUT ASSISTANCE WITH UNSTEADY GATE, WALKER ACQUIRED FOR 
PATIENT SAFETY, PATIENT SHOWS NO SIGN(S) OF DISTRESS.

## 2019-03-07 NOTE — DIAGNOSTIC IMAGING REPORT
Exam: Crosstable portable lateral view of the lumbar spine dated 3/7/2019 at

8:55 AM



History: X-ray for surgical level



Comparison: None available



Findings: Single crosstable lateral view shows 2 metallic wires with one

overlying the superior aspect of the L4 vertebral body and the other overlying

inferior aspect of the L4 vertebral body.



Signed by: Dr. Donnie eKnny DO on 3/7/2019 11:01 AM

## 2019-03-07 NOTE — DIAGNOSTIC IMAGING REPORT
Exam: Single crosstable portable lateral view of the lumbar spine dated

3/7/2019 at 9:13 AM.



History: Localization for surgical level



Comparison: None available



Findings: Single crosstable lateral view shows a probe pointing to the level of

the pedicles of L4.





Signed by: Dr. Donnie Kenny DO on 3/7/2019 11:03 AM

## 2019-03-07 NOTE — OPERATIVE REPORT
DATE OF PROCEDURE:  03/07/2019

 

SURGEON:  Mario Thompson MD

 

PREOPERATIVE DIAGNOSIS:  Right L4-L5 foraminal stenosis and chronic intraforaminal disk

herniation with L4 radiculopathy, M51.16. 

 

POSTOPERATIVE DIAGNOSIS:  Right L4-L5 foraminal stenosis and chronic intraforaminal disk

herniation with L4 radiculopathy, M51.16. 

 

PROCEDURES:  Right L4-L5 lateral foraminotomy, partial facetectomy, and microsurgical

diskectomy, 35747. 

 

ANESTHESIA:  General.

 

PROCEDURE IN DETAIL:  After induction of general anesthesia, the patient was placed on

the operating table in prone position over a Juno frame.  The lumbar region was

prepped and draped in sterile fashion.  A preoperative x-ray was obtained and a small

paramedian incision was created, the lumbar fascia was opened to the right of midline

and a subperiosteal dissection was carried out to expose the right side of the L4

lamina, the superior aspect of the L4-L5 hypertrophic facet joint and the lateral aspect

of the pars interarticularis of the L4.  Second x-ray confirming correct localization

below the L4 pedicle.  The operating microscope was brought in.  Retraction was

maintained with __________ expandable speculum retractor.  A high speed drill equipped

with 4 mm ronni bur was used to drill the lateral aspect of the pars interarticularis

of L4 and superior lateral aspect of the inferior articular process of L4.  This exposed

the superior tip of the superior articular process of the L5, which was then drilled

down as well.  The ligamentum flavum which was hypertrophic within the L4 neural foramen

was then resected and the L4 nerve root was exposed just below the L4 pedicle.  The

interforaminal chronic disk herniation came into view under the L4 nerve root.  The

epidural veins in this region were bipolar coagulated and divided with micro scissors.

The #11 blade was used to incise the capsule of the disk and the disk material was

retrieved and removed with a micropituitary rongeur.  A ball probe was used to probe

medially and laterally and additional fragments of chronically herniated disk were

retrieved and removed.  The L4 nerve root had become fully compressed at this stage.

The wound was copiously irrigated with bacitracin solution and a small piece of fat was

harvested from the subcutaneous compartment and placed over the nerve root.  The wound

was then closed with 0 and 2-0 Vicryl sutures and the skin was closed with 3-0 Monocryl

sutures in subcuticular fashion.  Steri-Strips and dressing were applied.  The patient

was awakened, extubated, and taken to postanesthesia care unit in a stable condition.

No intraoperative complications were encountered.  Estimated blood loss was 10 mL. 

 

 

 

 

______________________________

Mario Thompson MD PP/REBECCA

D:  03/07/2019 10:10:12

T:  03/07/2019 16:53:53

Job #:  [id number]

## 2019-03-08 VITALS — DIASTOLIC BLOOD PRESSURE: 67 MMHG | SYSTOLIC BLOOD PRESSURE: 148 MMHG

## 2019-03-08 VITALS — DIASTOLIC BLOOD PRESSURE: 58 MMHG | SYSTOLIC BLOOD PRESSURE: 129 MMHG

## 2019-03-08 VITALS — DIASTOLIC BLOOD PRESSURE: 61 MMHG | SYSTOLIC BLOOD PRESSURE: 130 MMHG

## 2019-03-08 VITALS — SYSTOLIC BLOOD PRESSURE: 130 MMHG | DIASTOLIC BLOOD PRESSURE: 61 MMHG

## 2019-03-08 VITALS — SYSTOLIC BLOOD PRESSURE: 114 MMHG | DIASTOLIC BLOOD PRESSURE: 57 MMHG

## 2019-03-08 RX ADMIN — SODIUM CHLORIDE, POTASSIUM CHLORIDE, SODIUM LACTATE AND CALCIUM CHLORIDE SCH MLS/HR: 600; 310; 30; 20 INJECTION, SOLUTION INTRAVENOUS at 06:25

## 2019-03-08 RX ADMIN — CEFAZOLIN SODIUM SCH MLS/HR: 1 SOLUTION INTRAVENOUS at 00:47

## 2019-03-08 NOTE — NUR
SOCIAL WORK INITIAL ASSESSMENT 

 to bedside to discuss plan of care with patient/family. CM/SW role and care 
transitions discussed. Anticipated discharge plan discussed along with duration of care. 
CM/SW discussed patients right to make decisions in care.  CM/SW work hours given.  

Patient lives: [facility]

Admit/Transfer: VIA ED

POA/Emergency contact: DAUGHTER VIRY BLAIR [phone]

Current/Previous Home Health: NONE

PCP/Follow-up Care: MONTANO

Current/Previous DME: WALKER BUT DOESNT USE

Other Services: NONE

Employment Status: RETIRED

Areas of Concerns: NONE

Referral Needs: NONE

Education Needs: NONE

IMM/MOON given and signed (if applicable): HERR

Goal for discharge: RETURN HOME

CM/SW left business card at the bedside with contact information. Name and number was also 
written on the patients whiteboard. Patient verbalized understanding of discussion. CM will 
follow-up with ongoing discharge and transition of care needs.

## 2019-03-08 NOTE — NUR
Gave report to oncoming nurse. Patient in bed. Patient in no pain or distress. call light 
within reach.

## 2019-03-08 NOTE — NUR
Pt wheeled out this time via wheelchair with 1 assist. Pt in stable condition, no c/o pain. 
Resp WNL. A&O x4. Daughter with pt. Dressing removed from incision site on lower medial 
back. Site clean and dry, no redness or swelling to site noted.

## 2020-02-07 ENCOUNTER — HOSPITAL ENCOUNTER (INPATIENT)
Dept: HOSPITAL 88 - ER | Age: 85
LOS: 5 days | Discharge: HOME | DRG: 308 | End: 2020-02-12
Attending: INTERNAL MEDICINE | Admitting: INTERNAL MEDICINE
Payer: MEDICARE

## 2020-02-07 VITALS — DIASTOLIC BLOOD PRESSURE: 96 MMHG | SYSTOLIC BLOOD PRESSURE: 152 MMHG

## 2020-02-07 VITALS — BODY MASS INDEX: 28.17 KG/M2 | WEIGHT: 153.06 LBS | HEIGHT: 62 IN

## 2020-02-07 VITALS — DIASTOLIC BLOOD PRESSURE: 81 MMHG | SYSTOLIC BLOOD PRESSURE: 135 MMHG

## 2020-02-07 VITALS — SYSTOLIC BLOOD PRESSURE: 124 MMHG | DIASTOLIC BLOOD PRESSURE: 99 MMHG

## 2020-02-07 DIAGNOSIS — I11.0: ICD-10-CM

## 2020-02-07 DIAGNOSIS — Z96.651: ICD-10-CM

## 2020-02-07 DIAGNOSIS — I50.23: ICD-10-CM

## 2020-02-07 DIAGNOSIS — I48.19: Primary | ICD-10-CM

## 2020-02-07 DIAGNOSIS — K21.9: ICD-10-CM

## 2020-02-07 DIAGNOSIS — Z88.6: ICD-10-CM

## 2020-02-07 DIAGNOSIS — Z85.41: ICD-10-CM

## 2020-02-07 DIAGNOSIS — Z96.642: ICD-10-CM

## 2020-02-07 DIAGNOSIS — Z88.5: ICD-10-CM

## 2020-02-07 LAB
ALBUMIN SERPL-MCNC: 3.5 G/DL (ref 3.5–5)
ALBUMIN/GLOB SERPL: 1.2 {RATIO} (ref 0.8–2)
ALP SERPL-CCNC: 92 IU/L (ref 40–150)
ALT SERPL-CCNC: 101 IU/L (ref 0–55)
ANION GAP SERPL CALC-SCNC: 18.7 MMOL/L (ref 8–16)
BASOPHILS # BLD AUTO: 0 10*3/UL (ref 0–0.1)
BASOPHILS NFR BLD AUTO: 0.7 % (ref 0–1)
BUN SERPL-MCNC: 45 MG/DL (ref 7–26)
BUN/CREAT SERPL: 28 (ref 6–25)
CALCIUM SERPL-MCNC: 8.8 MG/DL (ref 8.4–10.2)
CHLORIDE SERPL-SCNC: 109 MMOL/L (ref 98–107)
CK MB SERPL-MCNC: 1.5 NG/ML (ref 0–5)
CK MB SERPL-MCNC: 1.5 NG/ML (ref 0–5)
CK SERPL-CCNC: 37 IU/L (ref 29–168)
CK SERPL-CCNC: 48 IU/L (ref 29–168)
CO2 SERPL-SCNC: 20 MMOL/L (ref 22–29)
DEPRECATED APTT PLAS QN: 29.5 SECONDS (ref 23.8–35.5)
DEPRECATED INR PLAS: 1.05
DEPRECATED NEUTROPHILS # BLD AUTO: 4.3 10*3/UL (ref 2.1–6.9)
EGFRCR SERPLBLD CKD-EPI 2021: 30 ML/MIN (ref 60–?)
EOSINOPHIL # BLD AUTO: 0 10*3/UL (ref 0–0.4)
EOSINOPHIL NFR BLD AUTO: 0.6 % (ref 0–6)
ERYTHROCYTE [DISTWIDTH] IN CORD BLOOD: 12.6 % (ref 11.7–14.4)
GLOBULIN PLAS-MCNC: 3 G/DL (ref 2.3–3.5)
GLUCOSE SERPLBLD-MCNC: 110 MG/DL (ref 74–118)
HCT VFR BLD AUTO: 33.7 % (ref 34.2–44.1)
HGB BLD-MCNC: 10.6 G/DL (ref 12–16)
LYMPHOCYTES # BLD: 0.6 10*3/UL (ref 1–3.2)
LYMPHOCYTES NFR BLD AUTO: 10.4 % (ref 18–39.1)
MCH RBC QN AUTO: 29.4 PG (ref 28–32)
MCHC RBC AUTO-ENTMCNC: 31.5 G/DL (ref 31–35)
MCV RBC AUTO: 93.6 FL (ref 81–99)
MONOCYTES # BLD AUTO: 0.4 10*3/UL (ref 0.2–0.8)
MONOCYTES NFR BLD AUTO: 8 % (ref 4.4–11.3)
NEUTS SEG NFR BLD AUTO: 79.9 % (ref 38.7–80)
PLATELET # BLD AUTO: 188 X10E3/UL (ref 140–360)
POTASSIUM SERPL-SCNC: 4.7 MMOL/L (ref 3.5–5.1)
PROTHROMBIN TIME: 14.3 SECONDS (ref 11.9–14.5)
RBC # BLD AUTO: 3.6 X10E6/UL (ref 3.6–5.1)
SODIUM SERPL-SCNC: 143 MMOL/L (ref 136–145)

## 2020-02-07 PROCEDURE — 83735 ASSAY OF MAGNESIUM: CPT

## 2020-02-07 PROCEDURE — 96366 THER/PROPH/DIAG IV INF ADDON: CPT

## 2020-02-07 PROCEDURE — 78452 HT MUSCLE IMAGE SPECT MULT: CPT

## 2020-02-07 PROCEDURE — 71045 X-RAY EXAM CHEST 1 VIEW: CPT

## 2020-02-07 PROCEDURE — 82550 ASSAY OF CK (CPK): CPT

## 2020-02-07 PROCEDURE — 83880 ASSAY OF NATRIURETIC PEPTIDE: CPT

## 2020-02-07 PROCEDURE — 99284 EMERGENCY DEPT VISIT MOD MDM: CPT

## 2020-02-07 PROCEDURE — 80053 COMPREHEN METABOLIC PANEL: CPT

## 2020-02-07 PROCEDURE — 82948 REAGENT STRIP/BLOOD GLUCOSE: CPT

## 2020-02-07 PROCEDURE — 93017 CV STRESS TEST TRACING ONLY: CPT

## 2020-02-07 PROCEDURE — 93005 ELECTROCARDIOGRAM TRACING: CPT

## 2020-02-07 PROCEDURE — 36415 COLL VENOUS BLD VENIPUNCTURE: CPT

## 2020-02-07 PROCEDURE — 85025 COMPLETE CBC W/AUTO DIFF WBC: CPT

## 2020-02-07 PROCEDURE — 85610 PROTHROMBIN TIME: CPT

## 2020-02-07 PROCEDURE — 84100 ASSAY OF PHOSPHORUS: CPT

## 2020-02-07 PROCEDURE — 84484 ASSAY OF TROPONIN QUANT: CPT

## 2020-02-07 PROCEDURE — 85730 THROMBOPLASTIN TIME PARTIAL: CPT

## 2020-02-07 PROCEDURE — 80061 LIPID PANEL: CPT

## 2020-02-07 PROCEDURE — 93306 TTE W/DOPPLER COMPLETE: CPT

## 2020-02-07 PROCEDURE — 80048 BASIC METABOLIC PNL TOTAL CA: CPT

## 2020-02-07 PROCEDURE — 84443 ASSAY THYROID STIM HORMONE: CPT

## 2020-02-07 PROCEDURE — 96361 HYDRATE IV INFUSION ADD-ON: CPT

## 2020-02-07 PROCEDURE — 82553 CREATINE MB FRACTION: CPT

## 2020-02-07 RX ADMIN — FUROSEMIDE SCH MG: 10 INJECTION, SOLUTION INTRAMUSCULAR; INTRAVENOUS at 21:00

## 2020-02-07 NOTE — CONSULTATION
DATE OF CONSULTATION:  

 

Cardiology Consult 

 

HISTORY OF PRESENT ILLNESS:  Sky Campbell is an 84-year-old female with primary history

of hypertension, anemia, adenocarcinoma of the cervix, history of renal failure

secondary to pyelolithiasis, gout, arthritis, history of pericardial effusion, admitted,

complaining of palpitations associated with shortness of breath, swelling of the lower

extremities and generalized weakness that started about two weeks ago and two days prior

to admission, symptoms has worsened.  The patient denies any chest pain or dizziness.

Family also reports that the patient did have irregular and fast heart rate about 10

years ago. 

 

MEDICAL HISTORY:  As mentioned above.

 

PAST SURGICAL HISTORY:  Hysterectomy, cholecystectomy, back surgery, left hip

replacement, cataract implants, carpal tunnel surgery,  section. 

 

FAMILY HISTORY:  CAD, sister.  No diabetes in the family.

 

SOCIAL HISTORY:  Quit smoking in 67, occasional EtOH use.

 

MEDICATIONS:  The patient is currently takin. Metoprolol succinate 50 mg daily.

2. Also taking furosemide 20 mg daily.

3. Lisinopril 10 mg daily.

4. Citracal plus D3 250-107-500 mg daily.

5. Also taking alendronate sodium 70 mg once a week.

 

ALLERGIES:  ASPIRIN, ANTIHISTAMINE, MORPHINE AND CODEINE.

 

PHYSICAL EXAMINATION:

VITAL SIGNS:  Includes 133/80 blood pressure, heart rate of 92 with atrial fibrillation,

100% on room air, respirations 20, 97.8 temperature. 

GENERAL:  The patient is well-developed, well-nourished, no acute respiratory distress. 

SKIN:  Normal in appearance, texture, and temperature.  Warm and dry. 

HEENT:  The patient's cranium is normocephalic, atraumatic.  Pupils are equally round,

reactive to light and accommodation.  Sclerae nonicteric.  Ears are normal.  Mucosa is

moist.  Throat is clear. 

NECK:  Supple.  Full range of motion.  No cervical lymphadenopathy.  No thyromegaly.  No

bruits.  No JVD. 

RESPIRATORY:  Normal respiratory effort. 

LUNGS:  Clear to auscultation bilaterally.  No wheezing.  No rhonchi or rales heard. 

CARDIOVASCULAR:  S1 and S2 audible.  Irregular rate and rhythm. 

GI:  Soft, nontender, nondistended.  Bowel sounds are present. 

EXTREMITIES:  +1 to 2 pitting edema on the left lower extremity and trace to the right

lower extremity. 

NEUROLOGIC:  Motor and sensory examination of the upper and lower extremities is normal.

 Reflexes are normal and symmetrical bilaterally. 

ASSESSMENT:  The patient is an 84-year-old female admitted for atrial fibrillation with

RVR with heart rates to the 120s to 130s.  The patient was given Cardizem at the ED,

however, remains on atrial fibrillation, now with controlled heart rate 80s to 90s. 

 

PLAN:  

1. Telemetry and hemodynamic monitoring.

2. Start on anticoagulation, Eliquis and antiarrhythmic.  Also continue on beta blocker,

ACE inhibitors, diuretic furosemide 40 mg b.i.d. 

3. May need EP consult if arrhythmia is not responsive to pharmacy.

4. Do repeat echocardiogram. 

Thank you for this consultation.  We will continue to follow.

 

 

Dictated by Armida Brand NP

 

______________________________

MD JOHNSON Montez/REBECCA

D:  2020 15:19:11

T:  2020 22:45:37

Job #:  [id number]

## 2020-02-07 NOTE — NUR
The pt. was received from the ER with reported A fib. Upon arrival to the unit the records 
shows A Fib R VR and the pt. denies chest pain but does admit to difficulty when breathing 
when she feels palpitations.  She arrived with tele intact #6 and current rhythm is A fib at 
105.

## 2020-02-07 NOTE — DIAGNOSTIC IMAGING REPORT
Chest, 1 view,  2/7/2020.   

 



History: Shortness of breath.



Comparison: 3/5/2019.



Findings: The cardiomediastinal silhouette and pulmonary vasculature are mildly

prominent. There is no focal consolidation or pleural effusion. Linear opacity

is present in the left lung base. There are no acute osseous or soft tissue

abnormalities. 



Impression: 

Mild cardiomegaly and vascular congestion with left basilar atelectasis.



Signed by: Altaf Cordero on 2/7/2020 12:45 PM

## 2020-02-08 VITALS — SYSTOLIC BLOOD PRESSURE: 122 MMHG | DIASTOLIC BLOOD PRESSURE: 67 MMHG

## 2020-02-08 VITALS — SYSTOLIC BLOOD PRESSURE: 159 MMHG | DIASTOLIC BLOOD PRESSURE: 70 MMHG

## 2020-02-08 VITALS — DIASTOLIC BLOOD PRESSURE: 95 MMHG | SYSTOLIC BLOOD PRESSURE: 140 MMHG

## 2020-02-08 VITALS — DIASTOLIC BLOOD PRESSURE: 72 MMHG | SYSTOLIC BLOOD PRESSURE: 126 MMHG

## 2020-02-08 VITALS — DIASTOLIC BLOOD PRESSURE: 102 MMHG | SYSTOLIC BLOOD PRESSURE: 143 MMHG

## 2020-02-08 LAB
ALBUMIN SERPL-MCNC: 3.2 G/DL (ref 3.5–5)
ALBUMIN/GLOB SERPL: 1.2 {RATIO} (ref 0.8–2)
ALP SERPL-CCNC: 82 IU/L (ref 40–150)
ALT SERPL-CCNC: 81 IU/L (ref 0–55)
ANION GAP SERPL CALC-SCNC: 16.2 MMOL/L (ref 8–16)
BASOPHILS # BLD AUTO: 0 10*3/UL (ref 0–0.1)
BASOPHILS NFR BLD AUTO: 0.5 % (ref 0–1)
BUN SERPL-MCNC: 42 MG/DL (ref 7–26)
BUN/CREAT SERPL: 29 (ref 6–25)
CALCIUM SERPL-MCNC: 8.6 MG/DL (ref 8.4–10.2)
CHLORIDE SERPL-SCNC: 107 MMOL/L (ref 98–107)
CHOLEST SERPL-MCNC: 149 MD/DL (ref 0–199)
CHOLEST/HDLC SERPL: 3 {RATIO} (ref 3–3.6)
CK MB SERPL-MCNC: 1.6 NG/ML (ref 0–5)
CK MB SERPL-MCNC: 1.7 NG/ML (ref 0–5)
CK SERPL-CCNC: 37 IU/L (ref 29–168)
CK SERPL-CCNC: 44 IU/L (ref 29–168)
CO2 SERPL-SCNC: 21 MMOL/L (ref 22–29)
DEPRECATED NEUTROPHILS # BLD AUTO: 2.8 10*3/UL (ref 2.1–6.9)
EGFRCR SERPLBLD CKD-EPI 2021: 35 ML/MIN (ref 60–?)
EOSINOPHIL # BLD AUTO: 0.1 10*3/UL (ref 0–0.4)
EOSINOPHIL NFR BLD AUTO: 2.2 % (ref 0–6)
ERYTHROCYTE [DISTWIDTH] IN CORD BLOOD: 12.9 % (ref 11.7–14.4)
GLOBULIN PLAS-MCNC: 2.7 G/DL (ref 2.3–3.5)
GLUCOSE SERPLBLD-MCNC: 85 MG/DL (ref 74–118)
HCT VFR BLD AUTO: 32.1 % (ref 34.2–44.1)
HDLC SERPL-MSCNC: 50 MG/DL (ref 40–60)
HGB BLD-MCNC: 10 G/DL (ref 12–16)
LDLC SERPL CALC-MCNC: 84 MG/DL (ref 60–130)
LYMPHOCYTES # BLD: 0.6 10*3/UL (ref 1–3.2)
LYMPHOCYTES NFR BLD AUTO: 15 % (ref 18–39.1)
MCH RBC QN AUTO: 29.2 PG (ref 28–32)
MCHC RBC AUTO-ENTMCNC: 31.2 G/DL (ref 31–35)
MCV RBC AUTO: 93.9 FL (ref 81–99)
MONOCYTES # BLD AUTO: 0.5 10*3/UL (ref 0.2–0.8)
MONOCYTES NFR BLD AUTO: 12.5 % (ref 4.4–11.3)
NEUTS SEG NFR BLD AUTO: 69.3 % (ref 38.7–80)
PLATELET # BLD AUTO: 156 X10E3/UL (ref 140–360)
POTASSIUM SERPL-SCNC: 4.2 MMOL/L (ref 3.5–5.1)
RBC # BLD AUTO: 3.42 X10E6/UL (ref 3.6–5.1)
SODIUM SERPL-SCNC: 140 MMOL/L (ref 136–145)
TRIGL SERPL-MCNC: 74 MG/DL (ref 0–149)

## 2020-02-08 RX ADMIN — FUROSEMIDE SCH MG: 10 INJECTION, SOLUTION INTRAMUSCULAR; INTRAVENOUS at 08:57

## 2020-02-08 RX ADMIN — FUROSEMIDE SCH MG: 10 INJECTION, SOLUTION INTRAMUSCULAR; INTRAVENOUS at 21:00

## 2020-02-08 RX ADMIN — LISINOPRIL SCH MG: 10 TABLET ORAL at 11:00

## 2020-02-08 RX ADMIN — APIXABAN SCH MG: 2.5 TABLET, FILM COATED ORAL at 17:12

## 2020-02-08 RX ADMIN — METOPROLOL TARTRATE SCH MG: 25 TABLET, FILM COATED ORAL at 10:59

## 2020-02-08 RX ADMIN — METOPROLOL TARTRATE SCH MG: 25 TABLET, FILM COATED ORAL at 17:20

## 2020-02-08 NOTE — NUR
PATIENT'S PTT IS 74.5 - NO CHANGE ON RATE REQUIRED. CURRENT HEPARIN RATE IS 7CC/HR. DAILY 
PTT ORDER PLACED FOR TOMORROW, 2/9/20.

## 2020-02-08 NOTE — CONSULTATION
DATE OF CONSULTATION:  02/08/2020  

 

REASON FOR CONSULTATION:  Atrial fibrillation and cardiomyopathy.

 

HISTORY OF PRESENT ILLNESS:  Ms. Campbell is a pleasant 84-year-old woman with no

significant past cardiac history except for a pericardial window in the past and uterine

cancer, which was related.  About a month ago, she started to have palpitations, which

was one episode that happened while in [place] and did not last long.  She came in

yesterday with shortness of breath and palpitations.  She was found to be in atrial

fibrillation, heart rate of 120.  EF confirmed left atrial enlargement and EF of 25% to

30%.  EP has been consulted. 

 

PAST MEDICAL HISTORY:  Palpitations, hypertension, uterine cancer, pericardial window,

atrial fibrillation, cardiomyopathy, left atrial enlargement, and trace aortic

insufficiency. 

 

SOCIAL HISTORY:  The patient lives by herself.  Daughter is involved in the care.  No

drugs or smoking. 

 

FAMILY HISTORY:  Negative for sudden death or atrial fibrillation.

 

REVIEW OF SYSTEMS:

As above, otherwise negative for fevers, chills, loss of vision or blurry vision, ear

pain, ear discharge, headache, or numbness.  Positive for chest pain and palpitation.

Negative for cough, sputum, nausea, vomiting, dysuria, frequency, rash, bruise,

bleeding, clots, anxiety, depression, heat or cold intolerance. 

 

PHYSICAL EXAMINATION:

VITAL SIGNS:  Blood pressure 120/78, heart rate is 110 beats per minute. 

GENERAL:  The patient is lying in bed, in no apparent distress. 

NECK:  No lymphadenopathy.  Thyroid exam is normal. 

CARDIOVASCULAR:  S1 and S2. 

LUNGS:  Clear bilaterally. 

ABDOMEN:  Soft, benign. 

EXTREMITIES:  Warm and dry. 

NEURO:  Nonfocal. 

SKIN:  No new rash. 

PSYCH:  No anxiety or depression.

IMAGING DATA:  EKG shows atrial fibrillation, now with QRS.  Echo shows EF of 25% to

30%, left atrial enlargement. 

 

ASSESSMENT AND PLAN:  An 84-year-old woman with new onset atrial fibrillation,

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  She is very symptomatic.  I will

discuss with Dr. Mcduffie.  I will recommend starting on amiodarone.  NOAC, blood thinner

and DC cardioversion after a CHINYERE on Monday.  We will set them up for an outpatient EP

study and ablation for permanent treatment of atrial fibrillation, especially due to the

fact that she is highly symptomatic and she goes into cardiomyopathy and congestive

heart failure.  I discussed with her and also to her daughter on the phone.  In summary,

recommend CHINYERE cardioversion on Monday.  Start and continue amiodarone and Xarelto.  We

will set up for EP study and ablation, 

possibly Watchman and left atrial appendage closure as an outpatient, especially since

she uses a walker and has had history of falls. 

 

 

 

 

______________________________

MD HAKEEM Vergara/REBECCA

D:  02/08/2020 13:45:32

T:  02/08/2020 20:32:51

Job #:  [id number]

## 2020-02-08 NOTE — NUR
PATIENT IS AWAKE, ALERT, AND IN STABLE CONDITION WITH NO S/S OF RESPIRATORY DISTRESS- NO 
PAIN VOICED. TELEMETRY APPLIED. BED ALARM APPLIED. WALKER AVAILABLE FOR PATIENT IN ROOM. 
CALL LIGHT IS WITHIN REACH, PATIENT INSTRUCTED TO CALL FOR ASSISTANCE AS NEEDED

## 2020-02-08 NOTE — NUR
PATIENT IN STABLE CONDITION WITH NO S/S OF RESPIRATORY DISTRESS- NO PAIN VOICED. TELEMETRY 
APPLIED. DAUGHTER PRESENT IN ROOM. BED ALARM APPLIED. CALL LIGHT IS WITHIN REACH, PATIENT 
INSTRUCTED TO CALL FOR ASSISTANCE AS NEEDED. BEDSIDE SHIFT REPORT GIVEN TO ONCOMING NURSE.

## 2020-02-08 NOTE — CONSULTATION
DATE OF CONSULTATION:  02/08/2020  

 

ADDENDUM:  After discussion with Dr. Mcduffie, it appears that the left atrial size is

severely enlarged, highly unlikely for her to stay in normal sinus rhythm after

cardioversion or ablation.  Therefore, plan will change to BIV-ICD implantation and AV

node ablation, especially due to the fact that her low EF has been longstanding.  In

summary, we will set up for BIV-ICD implant and AV node ablation as an outpatient. 

 

 

 

 

______________________________

MD HAKEEM Vergara/REBECCA

D:  02/08/2020 14:24:40

T:  02/08/2020 20:25:53

Job #:  [id number]

## 2020-02-08 NOTE — HISTORY AND PHYSICAL
CHIEF COMPLAINT:  Palpitation and atrial fibrillation with rapid rate.

 

CONSULTANT:  Dr. Camilo Mcduffie.

 

PRIMARY CARE PHYSICIAN:  Dr. Buzz Edwards.

 

HISTORY OF PRESENT ILLNESS:  The patient is an 84-year-old female with cardiomyopathy,

ejection fraction approximately 25% or less.  The patient basically came into the

hospital with increasing shortness of breath and atrial fibrillation with rapid heart

rate.  The patient is otherwise stable.  She did not have any chest pain.  She has had

left lower extremity swelling.  The patient is unclear about her medication.  She was

told that she needed to drink plenty of water.  She is drinking approximately 6 to 8

large glasses a day and she gained approximately 4 to 5 pounds for the past few weeks.

The patient is otherwise stable at this time. 

 

PAST MEDICAL HISTORY:  Congestive heart failure, ejection fraction less than 25%,

hypertension, chronic kidney failure, chronic anemia, cervical adenocarcinoma with

history of hysterectomy previously. 

 

PAST SURGICAL HISTORY:  Hysterectomy, cholecystectomy, low back surgery, left hip

replacement, cataract surgery, implant, carpal tunnel surgery, and . 

 

SOCIAL HISTORY:  The patient does not smoke or use alcohol.  No regular drug.

 

ALLERGIES:  MULTIPLE ALLERGIES INCLUDING ANTIHISTAMINE, PLASTIC, ASPIRIN, CODEINE,

MORPHINE, AND NSAID. 

 

HOME MEDICATIONS:  The patient will bring in the whole bag of medication for review.

Current list is incorrect. 

 

PHYSICAL EXAMINATION:

VITAL SIGNS:  Temperature is 97, blood pressure 140/95, pulse rate is 103, and

respirations 18. 

GENERAL:  The patient is not in acute distress.  She is awake. 

HEENT:  Normocephalic and atraumatic.  Anicteric. 

NECK:  Supple grossly. 

PULMONARY:  Diminished breath sounds at bases with some rales. 

CARDIOVASCULAR:  Atrial fibrillation, slightly elevated rate. 

ABDOMEN:  Soft. 

EXTREMITIES:  Trace edema.  The patient has been receiving furosemide IV. 

NEUROLOGIC:  No focal deficit.

LABORATORY DATA:  WBC 5.4, hemoglobin 10.6, hematocrit 33.7, and platelets 188.

Chemistry; sodium 140, potassium 4.2, chloride 107, bicarb 21, BUN 42, creatinine 1.4,

and glucose is 85.  Cardiac enzymes are negative.  Liver enzymes slightly elevated.  AST

is 47 and ALT 81. 

 

BNP 1495. 

 

A chest x-ray, there is some vascular congestion.

 

IMPRESSION:  

1. Acute-on-chronic systolic dysfunction, congestive heart failure.

2. Atrial fibrillation with rapid ventricular rate response.

3. Multiple baseline problems.

 

PLAN:  Continue with diuresis.  Anticoagulant therapy.  Home medication will be reviewed

once bottle obtained.  We will continue current management.  The patient may need EP

evaluation.  She may be going home with a LifeVest with possible ICD placement in the

future, but that will be discussed with the patient by EP consultant.  At this time, we

will continue to manage the patient's symptoms and diurese the patient along with

controlling the heart rate. 

 

 

 

 

______________________________

MD ROGER Damon/MODL

D:  2020 10:56:56

T:  2020 16:58:50

Job #:  [id number]

## 2020-02-09 VITALS — SYSTOLIC BLOOD PRESSURE: 122 MMHG | DIASTOLIC BLOOD PRESSURE: 79 MMHG

## 2020-02-09 VITALS — DIASTOLIC BLOOD PRESSURE: 64 MMHG | SYSTOLIC BLOOD PRESSURE: 111 MMHG

## 2020-02-09 VITALS — SYSTOLIC BLOOD PRESSURE: 128 MMHG | DIASTOLIC BLOOD PRESSURE: 79 MMHG

## 2020-02-09 VITALS — DIASTOLIC BLOOD PRESSURE: 73 MMHG | SYSTOLIC BLOOD PRESSURE: 123 MMHG

## 2020-02-09 VITALS — SYSTOLIC BLOOD PRESSURE: 125 MMHG | DIASTOLIC BLOOD PRESSURE: 93 MMHG

## 2020-02-09 VITALS — SYSTOLIC BLOOD PRESSURE: 96 MMHG | DIASTOLIC BLOOD PRESSURE: 57 MMHG

## 2020-02-09 VITALS — DIASTOLIC BLOOD PRESSURE: 69 MMHG | SYSTOLIC BLOOD PRESSURE: 104 MMHG

## 2020-02-09 LAB
ANION GAP SERPL CALC-SCNC: 15.8 MMOL/L (ref 8–16)
BUN SERPL-MCNC: 43 MG/DL (ref 7–26)
BUN/CREAT SERPL: 30 (ref 6–25)
CALCIUM SERPL-MCNC: 8.5 MG/DL (ref 8.4–10.2)
CHLORIDE SERPL-SCNC: 108 MMOL/L (ref 98–107)
CO2 SERPL-SCNC: 23 MMOL/L (ref 22–29)
DEPRECATED PHOSPHATE SERPL-MCNC: 2.9 MG/DL (ref 2.3–4.7)
EGFRCR SERPLBLD CKD-EPI 2021: 36 ML/MIN (ref 60–?)
GLUCOSE SERPLBLD-MCNC: 79 MG/DL (ref 74–118)
MAGNESIUM SERPL-MCNC: 1.8 MG/DL (ref 1.3–2.1)
POTASSIUM SERPL-SCNC: 3.8 MMOL/L (ref 3.5–5.1)
SODIUM SERPL-SCNC: 143 MMOL/L (ref 136–145)
TSH SERPL DL<=0.005 MIU/L-ACNC: 1.84 UIU/ML (ref 0.35–4.94)

## 2020-02-09 RX ADMIN — FUROSEMIDE SCH MG: 10 INJECTION, SOLUTION INTRAMUSCULAR; INTRAVENOUS at 20:39

## 2020-02-09 RX ADMIN — FUROSEMIDE SCH MG: 10 INJECTION, SOLUTION INTRAMUSCULAR; INTRAVENOUS at 08:15

## 2020-02-09 RX ADMIN — APIXABAN SCH MG: 2.5 TABLET, FILM COATED ORAL at 08:15

## 2020-02-09 RX ADMIN — APIXABAN SCH MG: 2.5 TABLET, FILM COATED ORAL at 16:40

## 2020-02-09 RX ADMIN — METOPROLOL TARTRATE SCH MG: 25 TABLET, FILM COATED ORAL at 16:41

## 2020-02-09 RX ADMIN — LISINOPRIL SCH MG: 10 TABLET ORAL at 08:17

## 2020-02-09 RX ADMIN — METOPROLOL TARTRATE SCH MG: 25 TABLET, FILM COATED ORAL at 08:17

## 2020-02-09 NOTE — NUR
walking rounds complete, pt alert resp even and no c/o pain when asked, call light in reach. 
family member at bedside.

## 2020-02-09 NOTE — NUR
PATIENT IS IN STABLE CONDITION WITH NO S/S OF RESPIRATORY DISTRESS- PATIENT DENIES PAIN. 
TELEMETRY APPLIED. BED ALARM APPLIED. CALL LIGHT IS WITHIN REACH OF PATIENT- PATIENT 
INSTRUCTED TO CALL FOR ASSISTANCE AS NEEDED.

## 2020-02-09 NOTE — NUR
ROUNDS DONE, REPORT GIVEN TO 7AM NURSE, NO COMPLAINTS VOICED, REMAIN ON TELEMETRY.  CALL 
LIGHT REMAIN IN REACH.

## 2020-02-09 NOTE — NUR
PATIENT IS IN STABLE CONDITION WITH NO S/S OF RESPIRATORY DISTRESS. NO PAIN VOICED. 
TELEMETRY APPLIED. PATIENT WILL BE NPO AFTER MIDNIGHT FOR STRESS TEST- WILL SIGN THE 
CONSENT. DAUGHTER PRESENT IN ROOM. BED ALARM APPLIED. CALL LIGHT IS WITHIN REACH OF PATIENT- 
PATIENT INSTRUCTED TO CALL FOR ASSISTANCE AS NEEDED. BEDSIDE SHIFT REPORT GIVEN TO ONCOMING 
NURSE.

## 2020-02-10 VITALS — SYSTOLIC BLOOD PRESSURE: 104 MMHG | DIASTOLIC BLOOD PRESSURE: 62 MMHG

## 2020-02-10 VITALS — DIASTOLIC BLOOD PRESSURE: 60 MMHG | SYSTOLIC BLOOD PRESSURE: 117 MMHG

## 2020-02-10 VITALS — DIASTOLIC BLOOD PRESSURE: 62 MMHG | SYSTOLIC BLOOD PRESSURE: 104 MMHG

## 2020-02-10 VITALS — SYSTOLIC BLOOD PRESSURE: 143 MMHG | DIASTOLIC BLOOD PRESSURE: 85 MMHG

## 2020-02-10 VITALS — SYSTOLIC BLOOD PRESSURE: 135 MMHG | DIASTOLIC BLOOD PRESSURE: 82 MMHG

## 2020-02-10 VITALS — DIASTOLIC BLOOD PRESSURE: 81 MMHG | SYSTOLIC BLOOD PRESSURE: 124 MMHG

## 2020-02-10 RX ADMIN — APIXABAN SCH MG: 2.5 TABLET, FILM COATED ORAL at 08:47

## 2020-02-10 RX ADMIN — METOPROLOL TARTRATE SCH MG: 25 TABLET, FILM COATED ORAL at 17:25

## 2020-02-10 RX ADMIN — METOPROLOL TARTRATE SCH MG: 25 TABLET, FILM COATED ORAL at 09:00

## 2020-02-10 RX ADMIN — LISINOPRIL SCH MG: 10 TABLET ORAL at 16:10

## 2020-02-10 RX ADMIN — APIXABAN SCH MG: 2.5 TABLET, FILM COATED ORAL at 17:25

## 2020-02-10 NOTE — OPERATIVE REPORT
DATE OF PROCEDURE:  02/10/2020

 

SURGEON:  Camilo Mcduffie MD

 

PROCEDURE:  Lexiscan nuclear stress test.

 

TECHNIQUE:  The patient was given 11 mCi of Myoview.  Resting images were obtained in

the horizontal long axis, vertical long axis, and short axis.  The patient was then

hooked up to the EKG machine.  Lexiscan was infused over 15 seconds.  During Lexiscan

infusion, the patient had no chest pain and no EKG changes.  The patient was given 32.3

mCi of Myoview immediately after Lexiscan infusion.  Stress images were obtained in the

horizontal long axis, vertical long axis, and short axis.  The results as follows: 

1. The resting EKG demonstrated atrial fibrillation with nonspecific ST and T-wave

changes. 

2. There were no EKG changes and no symptoms during Lexiscan infusion.

3. There was normal perfusion to all segments of the myocardium in both stress and rest.

4. The left ventricle was dilated.  There was severe global hypokinesis with an ejection

fraction of 15%. 

 

CONCLUSION:  The patient has an enlarged left ventricle with severe global left

ventricular dysfunction and an ejection fraction of 15%.  There is no evidence of

myocardial ischemia. 

 

 

 

 

______________________________

Camilo Mcduffie MD

 

[facility]

D:  02/10/2020 14:03:57

T:  02/10/2020 20:41:25

Job #:  [id number]

## 2020-02-11 VITALS — DIASTOLIC BLOOD PRESSURE: 56 MMHG | SYSTOLIC BLOOD PRESSURE: 98 MMHG

## 2020-02-11 VITALS — DIASTOLIC BLOOD PRESSURE: 73 MMHG | SYSTOLIC BLOOD PRESSURE: 114 MMHG

## 2020-02-11 VITALS — DIASTOLIC BLOOD PRESSURE: 74 MMHG | SYSTOLIC BLOOD PRESSURE: 111 MMHG

## 2020-02-11 VITALS — DIASTOLIC BLOOD PRESSURE: 87 MMHG | SYSTOLIC BLOOD PRESSURE: 120 MMHG

## 2020-02-11 VITALS — SYSTOLIC BLOOD PRESSURE: 98 MMHG | DIASTOLIC BLOOD PRESSURE: 56 MMHG

## 2020-02-11 VITALS — SYSTOLIC BLOOD PRESSURE: 94 MMHG | DIASTOLIC BLOOD PRESSURE: 73 MMHG

## 2020-02-11 VITALS — SYSTOLIC BLOOD PRESSURE: 114 MMHG | DIASTOLIC BLOOD PRESSURE: 73 MMHG

## 2020-02-11 VITALS — DIASTOLIC BLOOD PRESSURE: 73 MMHG | SYSTOLIC BLOOD PRESSURE: 94 MMHG

## 2020-02-11 RX ADMIN — APIXABAN SCH MG: 2.5 TABLET, FILM COATED ORAL at 08:21

## 2020-02-11 RX ADMIN — METOPROLOL TARTRATE SCH MG: 25 TABLET, FILM COATED ORAL at 17:21

## 2020-02-11 RX ADMIN — FUROSEMIDE SCH MG: 40 TABLET ORAL at 08:21

## 2020-02-11 RX ADMIN — METOPROLOL TARTRATE SCH MG: 25 TABLET, FILM COATED ORAL at 10:18

## 2020-02-11 RX ADMIN — APIXABAN SCH MG: 2.5 TABLET, FILM COATED ORAL at 17:20

## 2020-02-11 RX ADMIN — LISINOPRIL SCH MG: 10 TABLET ORAL at 11:50

## 2020-02-11 NOTE — NUR
Called [facility] customer care, required information faxed over, awaiting their reply.

## 2020-02-11 NOTE — NUR
Bedside report given to day nurse. Patient awake and resting in bed, no s/s of distress or 
c/o pain at this time. All safety measures in place.

## 2020-02-11 NOTE — NUR
Faxed [employer] documents to [employer] , Talked to personnel there, they said they need the Medical 
record sign by the MD before they deliver Life vest and they will fax over the form here, 
Notified Dr Wu he said he will sign that tomorrow AM.

## 2020-02-11 NOTE — NUR
Received bedside report from day nurse. Patient awake and sitting up in bed, no s/s of 
distress or c/o pain at this time. All safety measures in place. Will continue to monitor.

## 2020-02-12 VITALS — SYSTOLIC BLOOD PRESSURE: 114 MMHG | DIASTOLIC BLOOD PRESSURE: 84 MMHG

## 2020-02-12 VITALS — SYSTOLIC BLOOD PRESSURE: 129 MMHG | DIASTOLIC BLOOD PRESSURE: 70 MMHG

## 2020-02-12 VITALS — SYSTOLIC BLOOD PRESSURE: 109 MMHG | DIASTOLIC BLOOD PRESSURE: 65 MMHG

## 2020-02-12 VITALS — DIASTOLIC BLOOD PRESSURE: 76 MMHG | SYSTOLIC BLOOD PRESSURE: 141 MMHG

## 2020-02-12 VITALS — SYSTOLIC BLOOD PRESSURE: 113 MMHG | DIASTOLIC BLOOD PRESSURE: 71 MMHG

## 2020-02-12 RX ADMIN — METOPROLOL TARTRATE SCH MG: 25 TABLET, FILM COATED ORAL at 17:00

## 2020-02-12 RX ADMIN — LISINOPRIL SCH MG: 10 TABLET ORAL at 09:20

## 2020-02-12 RX ADMIN — APIXABAN SCH MG: 2.5 TABLET, FILM COATED ORAL at 09:16

## 2020-02-12 RX ADMIN — FUROSEMIDE SCH MG: 40 TABLET ORAL at 09:17

## 2020-02-12 RX ADMIN — METOPROLOL TARTRATE SCH MG: 25 TABLET, FILM COATED ORAL at 09:19

## 2020-02-12 RX ADMIN — APIXABAN SCH MG: 2.5 TABLET, FILM COATED ORAL at 17:00

## 2020-02-12 NOTE — NUR
Received bedside report from day nurse. Patient resting in bed, no s/s of distress or c/o 
pain at this time. Currently waiting for daughter to come pick her up. All safety measures 
in place. Will continue to monitor.

## 2020-02-12 NOTE — EXERCISE STRESS TEST
DATE OF STUDY:  02/09/2020 11:08:00

 

Stress Test - Treadmill ONLY

 

PROCEDURE:  Lexiscan nuclear stress test.

 

TECHNIQUE:  The patient was given 11 mCi of Myoview.  Resting images were obtained in

the horizontal long axis, vertical long axis, and short axis.  The patient was then

hooked up to the EKG machine.  Lexiscan was infused over 15 seconds.  During Lexiscan

infusion, the patient had no chest pain and no EKG changes.  The patient was given 32.3

mCi of Myoview immediately after Lexiscan infusion.  Stress images were obtained in the

horizontal long axis, vertical long axis, and short axis.  The results as follows: 

1. The resting EKG demonstrated atrial fibrillation with nonspecific ST and T-wave

changes. 

2. There were no EKG changes and no symptoms during Lexiscan infusion.

3. There was normal perfusion to all segments of the myocardium in both stress and rest.

4. The left ventricle was dilated.  There was severe global hypokinesis with an ejection

fraction of 15%. 

 

CONCLUSION:  The patient has an enlarged left ventricle with severe global left

ventricular dysfunction and an ejection fraction of 15%.  There is no evidence of

myocardial ischemia. 

 

 

 

 

______________________________

[facility]

D:  02/10/2020 14:03:57

T:  02/12/2020 16:55:51

Job #:  [id number]

## 2020-02-12 NOTE — NUR
Nutrition Screen Note



RD Recommendation for Physician: 

-Continue current diet per MD. 





Plan of Care: RD following, monitoring for tolerance and adequacy. Education provided. 



Nutrition reason for involvement:

(LOS) 



Primary Diagnose(s): Afib with RVR



PMH: Palpitations, hypertension, uterine cancer, pericardial window,

atrial fibrillation, cardiomyopathy, left atrial enlargement, and trace aortic

insufficiency.



Ht: 62  in 

Wt: 153 lb

BMI: 28.0  kg/m2

IBW: 110 lb



RD Assessment:

(2/12: 84 YOF admitted for afib with PMH listed above. The pt reported her appetite was 
good, a finished meal was sitting in front of her. She denied N/V/C/D/chewing ors wallowing 
issues as well as any food allergies. She did state she reacts to some foods that have the 
medications she is allergic to in them, but she can self select food for herself regarding 
her tolerance and does not want anything changed in her chart. The pt was open to receiving 
education regarding low Na diet and reported she typically follows a low na diet at home. 
Chart reviewed. Labs and meds reviewed. Pending d/c today. Will continue to monitor. 



Current Diet: cardiac 



Malnutrition Evaluation 2/12 

The patient does not meet criteria for a specified degree of malnutrition at this time. Will 
re-evaluate at follow-up as appropriate. 





Diet Education Needs Assessment:

Diet education indicated, 



Diet Adequacy:

Meeting calorie needs, Meeting protein needs





Learner(s): pt 

Barriers:  none 

Cultural/Language Modifications: none 

Readiness: acceptance 

Method: discussion, handout 

Topics: Low Na Diet 

Understanding/Compliance: verbalized understanding, anticipate good compliance 





Nutrition Care Level: low

## 2020-02-12 NOTE — NUR
Bedside report given to day nurse. Patient awake and resting in bed, no s/s of distress at 
this time. All safety measures in place.

## 2020-02-12 NOTE — NUR
The person is here for life vest and stated that the attending  needs to sign the order 
for the vest. He was provided the number for Dr. Wu and he communicated with him and we 
continue to wait for the life vest.

## 2020-02-12 NOTE — NUR
Informed by tele that patient had 6 beats of V-tach. Patient in restroom. In stable 
condition, no s/s of distress or c/o pain. Assisted patient back to bed via walker. Now 
running afib @ 87. All safety measures in place. Will continue to monitor.

## 2020-02-12 NOTE — NUR
Dr. HORAEC Ahuja was notified that the pt. had 18 beats of v fib during the previous shift and 
no orders were received.

## 2020-02-12 NOTE — NUR
The pt. is in bed awake and alert. Dr Wu visited and the pt. can go home after the life 
vest is placed. The  ordered that the cardiologist sign the order for the vest.

## 2020-02-12 NOTE — NUR
Informed by tele that patient had 18 beats of v-tach. Patient sleeping in bed, no s/s of 
distress or c/o pain, denies needs at this time. Tele monitor showing sinus tach @ 91. Paged 
Dr. MAGY Mcduffie, awaiting return call at this time. Will continue to monitor patient.

## 2024-07-22 NOTE — OPERATIVE REPORT
DATE OF PROCEDURE:  August 07, 2018 

 

PREOPERATIVE DIAGNOSIS:  Left nephrolithiasis.



POSTOPERATIVE DIAGNOSIS:  Left nephrolithiasis. 



PROCEDURE PERFORMED:  

1. Staged left-sided extracorporeal shockwave lithotripsy.  

2. Supervision of fluoroscopy.  No radiologist present.  



ANESTHESIA:  General.



COMPLICATIONS:  None.



CLINICAL SUMMARY:  Sky Campbell is an 83-year-old woman with 

nephrolithiasis.  She is brought for management.  She is aware of the risks 

of bleeding, infection, injury to adjacent structures, need for additional 

procedures and elected proceed.



OPERATIVE PROCEDURE IN DETAIL:  Informed consent was verified.  Sky Campbell 

was properly identified and taken to the operating room and placed on the 

lithotripsy table in supine position.  Anesthesia was uneventfully begun.  

The patient's upper caliceal 6 mm stone and lower caliceal 5 mm stones were 

localized with biplanar fluoroscopy.  Total of 2500 shocks were delivered 

splitting them among the 2 stones.  The patient was then uneventfully 

reversed from anesthesia and taken to the recovery room in stable 

condition.  No complications during the procedure.  She tolerated the 

procedure well.  





Plans will be to return the patient to the operating room in several weeks 

to perform a right versus left ESWL with possible cystoscopy and 

retrogrades at that same time.  



  



DD:  10/02/2018 14:40

DT:  10/02/2018 15:36

Job#:  [id number]  [[email or web address]] : 6/26/2024- Open exploratory laparotomy, right hemicolectomy with end-to-side stapled anastomosis and diverting loop ileostomy.  Sarcoma excision part will be dictated by Dr. Carias. PATHOLOGY:  Addendum This addendum is to report the findings of Part 2-Mesenteric mass by the Bone and soft tissue service.  2. Mesenteric mass, resection: - Dedifferentiated liposarcoma (see note). - The dedifferentiated component predominates and is a spindled and pleomorphic sarcoma with variable myxoid and collagenous stroma compatible with myxofibrosarcoma-like morphology. - Tumor size is 33.0 x 29.0 x 18.0cm. - Tumor involves retroperitoneal soft tissues. - Mitotic rate is 10/10 HPFs. - Necrosis is identified (15%). - The tumor is enclosed in a fibromembrane which represents the margin. - See synoptic summary.  Note: Sections show a spindle and pleomorphic malignant neoplasm with conspicuous myxoid stroma and delicate thin wall vessels. Multifocal adipocytic differentiation is present consistent with a well-differentiated liposarcoma component (e.g 2N). Performed immunostains show that the tumor cells are positive for CD34 and negative for S100, SOX10, SMA (non-specific nuclear staining), desmin and myogenin. Scattered tumor cells are positive for MDM2 and very rare cells at the periphery express desmin. A fluorescent in situ hybridization  2: Soft Tissue - Resection Clinical Preresection Treatment:   No known preresection therapy Specimen Procedure:  Marginal resection Tumor Tumor Focality:   Unifocal Tumor Site:  Retroperitoneum - Mesentery Tumor Size:  33.0 x 29.0 x 18.0 Centimeters (cm) Histologic Type (WHO):   Adipocytic Tumors Histologic Type:   Dedifferentiated liposarcoma Histologic Grade (FNCLCC):   Grade 3 Mitotic Rate:   10 mitoses per 10 high-power fields (HPF) Necrosis (macroscopic or microscopic):    Present Extent of Necrosis:   15% Treatment Effect:   No known presurgical therapy Lymphovascular Invasion:   Not identified Margins Margin Status:   The tumor is enclosed in a fibromembrane which represents the margin Regional Lymph Nodes Regional Lymph Node Status:   Not applicable (no regional lymph nodes submitted or found) Pathologic Stage Classification (pTNM, AJCC 8th Edition) Pathologic Stage Classification:    Histologic type appropriate for staging pT Category:  pT4 pN Category:   pN not assigned (no nodes submitted or found) Special Studies Immunohistochemistry:   Positive for CD34 and negative for S100, SOX10, SMA, desmin and myogenin. Scattered cells express MDM2 immunostain. Cytogenetics:   MDM2 FISH is positive for MDM2 gene amplification (see [id number]) Molecular Pathology:   Not performed Best Tumor Blocks for Future Studies Tumor Block(s):   2S, 2C, 2A